# Patient Record
Sex: FEMALE | Race: WHITE | Employment: STUDENT | URBAN - METROPOLITAN AREA
[De-identification: names, ages, dates, MRNs, and addresses within clinical notes are randomized per-mention and may not be internally consistent; named-entity substitution may affect disease eponyms.]

---
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
## 2023-09-18 ENCOUNTER — OFFICE VISIT (OUTPATIENT)
Age: 18
End: 2023-09-18
Payer: COMMERCIAL

## 2023-09-18 VITALS
HEIGHT: 62 IN | BODY MASS INDEX: 25.21 KG/M2 | SYSTOLIC BLOOD PRESSURE: 138 MMHG | DIASTOLIC BLOOD PRESSURE: 69 MMHG | WEIGHT: 137 LBS

## 2023-09-18 DIAGNOSIS — N92.6 IRREGULAR MENSES: ICD-10-CM

## 2023-09-18 DIAGNOSIS — Z11.3 SCREENING FOR VENEREAL DISEASE: ICD-10-CM

## 2023-09-18 DIAGNOSIS — Z00.01 ANNUAL VISIT FOR GENERAL ADULT MEDICAL EXAMINATION WITH ABNORMAL FINDINGS: Primary | ICD-10-CM

## 2023-09-18 PROCEDURE — 99395 PREV VISIT EST AGE 18-39: CPT | Performed by: OBSTETRICS & GYNECOLOGY

## 2023-09-18 RX ORDER — NORETHINDRONE ACETATE AND ETHINYL ESTRADIOL, ETHINYL ESTRADIOL AND FERROUS FUMARATE 1MG-10(24)
KIT ORAL
Qty: 1 PACKET | Refills: 11 | Status: SHIPPED | OUTPATIENT
Start: 2023-09-18 | End: 2023-09-18

## 2023-09-18 RX ORDER — NORETHINDRONE ACETATE AND ETHINYL ESTRADIOL, ETHINYL ESTRADIOL AND FERROUS FUMARATE 1MG-10(24)
KIT ORAL
Qty: 1 PACKET | Refills: 11 | Status: SHIPPED | OUTPATIENT
Start: 2023-09-18

## 2023-09-18 NOTE — PROGRESS NOTES
Species)     18 yo ANNUAL    1. Annual visit for general adult medical examination with abnormal findings    - NuSwab Vaginitis Plus (VG+) with Candida (Six Species)    2. Irregular menses    - norethindrone-ethinyl estradiol-Fe (LO LOESTRIN FE) 1 MG-10 MCG / 10 MCG tablet; TAKE 1 TABLET BY MOUTH DAILY FOR 72 DAYS. TAKE 1 TABLET BY MOUTH DAILY, DO NOT TAKE PLACEBO TABS  Dispense: 1 packet; Refill: 11    3. Screening for venereal disease    - NuSwab Vaginitis Plus (VG+) with Candida (Six Species)       Return in about 1 year (around 9/18/2024).

## 2023-11-21 DIAGNOSIS — N92.6 IRREGULAR MENSES: ICD-10-CM

## 2023-11-21 RX ORDER — NORETHINDRONE ACETATE AND ETHINYL ESTRADIOL, ETHINYL ESTRADIOL AND FERROUS FUMARATE 1MG-10(24)
KIT ORAL
Qty: 3 PACKET | Refills: 3 | Status: SHIPPED | OUTPATIENT
Start: 2023-11-21

## 2024-12-11 DIAGNOSIS — N92.6 IRREGULAR MENSES: ICD-10-CM

## 2024-12-11 RX ORDER — NORETHINDRONE ACETATE AND ETHINYL ESTRADIOL, ETHINYL ESTRADIOL AND FERROUS FUMARATE 1MG-10(24)
KIT ORAL
Qty: 3 PACKET | Refills: 0 | Status: SHIPPED | OUTPATIENT
Start: 2024-12-11

## 2024-12-11 RX ORDER — NORETHINDRONE ACETATE AND ETHINYL ESTRADIOL, ETHINYL ESTRADIOL AND FERROUS FUMARATE 1MG-10(24)
KIT ORAL
Qty: 84 TABLET | Refills: 0 | OUTPATIENT
Start: 2024-12-11

## 2024-12-11 NOTE — TELEPHONE ENCOUNTER
Pt and pt is requesting a refill on her birth control and can it be sent to [facility] in [place], [place]; pt is scheduled for Jan 13th for her annual exam.ccm  
Refill submitted.  
.

## 2025-01-13 ENCOUNTER — OFFICE VISIT (OUTPATIENT)
Age: 20
End: 2025-01-13
Payer: COMMERCIAL

## 2025-01-13 VITALS
BODY MASS INDEX: 26.71 KG/M2 | HEIGHT: 62 IN | DIASTOLIC BLOOD PRESSURE: 70 MMHG | SYSTOLIC BLOOD PRESSURE: 124 MMHG | WEIGHT: 145.13 LBS

## 2025-01-13 DIAGNOSIS — N92.6 IRREGULAR MENSES: ICD-10-CM

## 2025-01-13 DIAGNOSIS — Z00.00 ANNUAL VISIT FOR GENERAL ADULT MEDICAL EXAMINATION WITHOUT ABNORMAL FINDINGS: Primary | ICD-10-CM

## 2025-01-13 DIAGNOSIS — Z11.51 SCREENING FOR HUMAN PAPILLOMAVIRUS: ICD-10-CM

## 2025-01-13 DIAGNOSIS — Z01.419 PAP SMEAR, AS PART OF ROUTINE GYNECOLOGICAL EXAMINATION: ICD-10-CM

## 2025-01-13 DIAGNOSIS — Z11.3 SCREENING FOR VENEREAL DISEASE: ICD-10-CM

## 2025-01-13 PROCEDURE — 99395 PREV VISIT EST AGE 18-39: CPT | Performed by: OBSTETRICS & GYNECOLOGY

## 2025-01-13 RX ORDER — NORETHINDRONE ACETATE AND ETHINYL ESTRADIOL, ETHINYL ESTRADIOL AND FERROUS FUMARATE 1MG-10(24)
KIT ORAL
Qty: 3 PACKET | Refills: 4 | Status: SHIPPED | OUTPATIENT
Start: 2025-01-13

## 2025-01-13 NOTE — PROGRESS NOTES
PAP IG, CT-NG-TV, rfx Aptima HPV ASCUS (199325)     Order Specific Question:   Pap Source?          (Required)     Answer:   CERVIX     Order Specific Question:   Pap collection method?          (Required)     Answer:   BROOM-ALONE     Order Specific Question:   Menstrual Status ?     Answer:   Other - See Notes [[id number]]     Order Specific Question:   Previous Treatment?          (Required)     Answer:   NONE     18 yo ANNUAL    1. Annual visit for general adult medical examination without abnormal findings    - PAP IG, CT-NG-TV, rfx Aptima HPV ASCUS (199325)    2. Irregular menses    - norethindrone-ethinyl estradiol-Fe (LO LOESTRIN FE) 1 MG-10 MCG / 10 MCG tablet; TAKE 1 TABLET BY MOUTH DAILY FOR 72 DAYS. TAKE 1 TABLET BY MOUTH DAILY, DO NOT TAKE PLACEBO TABS  Dispense: 3 packet; Refill: 4    3. Pap smear, as part of routine gynecological examination    - PAP IG, CT-NG-TV, rfx Aptima HPV ASCUS (199325)    4. Screening for human papillomavirus    - PAP IG, CT-NG-TV, rfx Aptima HPV ASCUS (199325)    5. Screening for venereal disease    - PAP IG, CT-NG-TV, rfx Aptima HPV ASCUS (199325)       No follow-ups on file.

## 2025-01-20 LAB
., LABCORP: NORMAL
C TRACH RRNA CVX QL NAA+PROBE: NEGATIVE
CYTOLOGIST CVX/VAG CYTO: NORMAL
CYTOLOGY CVX/VAG DOC CYTO: NORMAL
CYTOLOGY CVX/VAG DOC THIN PREP: NORMAL
DX ICD CODE: NORMAL
Lab: NORMAL
Lab: NORMAL
N GONORRHOEA RRNA CVX QL NAA+PROBE: NEGATIVE
OTHER STN SPEC: NORMAL
STAT OF ADQ CVX/VAG CYTO-IMP: NORMAL
T VAGINALIS RRNA SPEC QL NAA+PROBE: NEGATIVE